# Patient Record
Sex: FEMALE | Race: WHITE | Employment: UNEMPLOYED | URBAN - METROPOLITAN AREA
[De-identification: names, ages, dates, MRNs, and addresses within clinical notes are randomized per-mention and may not be internally consistent; named-entity substitution may affect disease eponyms.]

---

## 2017-02-15 ENCOUNTER — LAB ENCOUNTER (OUTPATIENT)
Dept: LAB | Facility: HOSPITAL | Age: 52
End: 2017-02-15
Attending: INTERNAL MEDICINE
Payer: MEDICAID

## 2017-02-15 DIAGNOSIS — B15.9: ICD-10-CM

## 2017-02-15 DIAGNOSIS — D64.9 ANEMIA, UNSPECIFIED: Primary | ICD-10-CM

## 2017-02-15 DIAGNOSIS — Z11.59 SCREENING EXAMINATION FOR POLIOMYELITIS: ICD-10-CM

## 2017-02-15 DIAGNOSIS — E55.9 AVITAMINOSIS D: ICD-10-CM

## 2017-02-15 LAB
AFP-TM SERPL-MCNC: 9.4 NG/ML (ref 0–8.9)
ALBUMIN SERPL BCP-MCNC: 4.1 G/DL (ref 3.5–4.8)
ALBUMIN SERPL BCP-MCNC: 4.1 G/DL (ref 3.5–4.8)
ALBUMIN/GLOB SERPL: 1.5 {RATIO} (ref 1–2)
ALP SERPL-CCNC: 69 U/L (ref 32–100)
ALP SERPL-CCNC: 69 U/L (ref 32–100)
ALT SERPL-CCNC: 21 U/L (ref 14–54)
ALT SERPL-CCNC: 21 U/L (ref 14–54)
ANION GAP SERPL CALC-SCNC: 10 MMOL/L (ref 0–18)
AST SERPL-CCNC: 23 U/L (ref 15–41)
AST SERPL-CCNC: 23 U/L (ref 15–41)
BASOPHILS # BLD: 0.1 K/UL (ref 0–0.2)
BASOPHILS NFR BLD: 1 %
BILIRUB DIRECT SERPL-MCNC: 0.1 MG/DL (ref 0–0.2)
BILIRUB SERPL-MCNC: 0.7 MG/DL (ref 0.3–1.2)
BILIRUB SERPL-MCNC: 0.7 MG/DL (ref 0.3–1.2)
BUN SERPL-MCNC: 15 MG/DL (ref 8–20)
BUN/CREAT SERPL: 17.6 (ref 10–20)
CALCIUM SERPL-MCNC: 10.1 MG/DL (ref 8.5–10.5)
CHLORIDE SERPL-SCNC: 102 MMOL/L (ref 95–110)
CO2 SERPL-SCNC: 26 MMOL/L (ref 22–32)
CREAT SERPL-MCNC: 0.85 MG/DL (ref 0.5–1.5)
EOSINOPHIL # BLD: 0.7 K/UL (ref 0–0.7)
EOSINOPHIL NFR BLD: 9 %
ERYTHROCYTE [DISTWIDTH] IN BLOOD BY AUTOMATED COUNT: 14 % (ref 11–15)
GLOBULIN PLAS-MCNC: 2.8 G/DL (ref 2.5–3.7)
GLUCOSE SERPL-MCNC: 88 MG/DL (ref 70–99)
HCT VFR BLD AUTO: 38 % (ref 35–48)
HGB BLD-MCNC: 12.4 G/DL (ref 12–16)
LYMPHOCYTES # BLD: 1.7 K/UL (ref 1–4)
LYMPHOCYTES NFR BLD: 23 %
MCH RBC QN AUTO: 28.3 PG (ref 27–32)
MCHC RBC AUTO-ENTMCNC: 32.7 G/DL (ref 32–37)
MCV RBC AUTO: 86.5 FL (ref 80–100)
MONOCYTES # BLD: 0.6 K/UL (ref 0–1)
MONOCYTES NFR BLD: 8 %
NEUTROPHILS # BLD AUTO: 4.3 K/UL (ref 1.8–7.7)
NEUTROPHILS NFR BLD: 58 %
OSMOLALITY UR CALC.SUM OF ELEC: 286 MOSM/KG (ref 275–295)
PLATELET # BLD AUTO: 346 K/UL (ref 140–400)
PMV BLD AUTO: 9.1 FL (ref 7.4–10.3)
POTASSIUM SERPL-SCNC: 4.1 MMOL/L (ref 3.3–5.1)
PROT SERPL-MCNC: 6.9 G/DL (ref 5.9–8.4)
PROT SERPL-MCNC: 6.9 G/DL (ref 5.9–8.4)
RBC # BLD AUTO: 4.39 M/UL (ref 3.7–5.4)
SODIUM SERPL-SCNC: 138 MMOL/L (ref 136–144)
T3FREE SERPL-MCNC: 3.96 PG/ML (ref 2.53–4.29)
T4 FREE SERPL-MCNC: 1.08 NG/DL (ref 0.58–1.64)
TSH SERPL-ACNC: 0.86 UIU/ML (ref 0.34–5.6)
VIT B12 SERPL-MCNC: 694 PG/ML (ref 181–914)
WBC # BLD AUTO: 7.4 K/UL (ref 4–11)

## 2017-02-15 PROCEDURE — 84481 FREE ASSAY (FT-3): CPT

## 2017-02-15 PROCEDURE — 82607 VITAMIN B-12: CPT

## 2017-02-15 PROCEDURE — 36415 COLL VENOUS BLD VENIPUNCTURE: CPT

## 2017-02-15 PROCEDURE — 82105 ALPHA-FETOPROTEIN SERUM: CPT

## 2017-02-15 PROCEDURE — 82248 BILIRUBIN DIRECT: CPT

## 2017-02-15 PROCEDURE — 83036 HEMOGLOBIN GLYCOSYLATED A1C: CPT

## 2017-02-15 PROCEDURE — 80053 COMPREHEN METABOLIC PANEL: CPT

## 2017-02-15 PROCEDURE — 84439 ASSAY OF FREE THYROXINE: CPT

## 2017-02-15 PROCEDURE — 82306 VITAMIN D 25 HYDROXY: CPT

## 2017-02-15 PROCEDURE — 85025 COMPLETE CBC W/AUTO DIFF WBC: CPT

## 2017-02-15 PROCEDURE — 84443 ASSAY THYROID STIM HORMONE: CPT

## 2017-02-16 LAB — HBA1C MFR BLD: 5.2 % (ref 4–6)

## 2017-02-17 LAB — 25(OH)D3 SERPL-MCNC: 28.9 NG/ML

## 2017-02-24 ENCOUNTER — HOSPITAL ENCOUNTER (OUTPATIENT)
Dept: ULTRASOUND IMAGING | Facility: HOSPITAL | Age: 52
Discharge: HOME | End: 2017-02-24
Attending: INTERNAL MEDICINE
Payer: MEDICAID

## 2017-02-24 ENCOUNTER — LAB ENCOUNTER (OUTPATIENT)
Dept: LAB | Facility: HOSPITAL | Age: 52
End: 2017-02-24
Attending: INTERNAL MEDICINE
Payer: MEDICAID

## 2017-02-24 DIAGNOSIS — E11.9 DIABETES MELLITUS (HCC): ICD-10-CM

## 2017-02-24 DIAGNOSIS — E05.90 PRETIBIAL MYXEDEMA: ICD-10-CM

## 2017-02-24 DIAGNOSIS — Z11.59 SCREENING EXAMINATION FOR POLIOMYELITIS: Primary | ICD-10-CM

## 2017-02-24 DIAGNOSIS — E78.2 MIXED HYPERLIPIDEMIA: ICD-10-CM

## 2017-02-24 DIAGNOSIS — K76.89 LIVER NODULE: ICD-10-CM

## 2017-02-24 DIAGNOSIS — D64.9 ANEMIA, UNSPECIFIED: ICD-10-CM

## 2017-02-24 DIAGNOSIS — E10.21 TYPE I DIABETES MELLITUS WITH NEPHROPATHY (HCC): ICD-10-CM

## 2017-02-24 LAB
CHOLEST SERPL-MCNC: 194 MG/DL (ref 110–200)
HDLC SERPL-MCNC: 41 MG/DL
LDLC SERPL CALC-MCNC: 125 MG/DL (ref 0–99)
NONHDLC SERPL-MCNC: 153 MG/DL
TRIGL SERPL-MCNC: 139 MG/DL (ref 1–149)

## 2017-02-24 PROCEDURE — 80061 LIPID PANEL: CPT

## 2017-02-24 PROCEDURE — 76705 ECHO EXAM OF ABDOMEN: CPT

## 2017-02-24 PROCEDURE — 36415 COLL VENOUS BLD VENIPUNCTURE: CPT

## 2018-09-18 NOTE — PROGRESS NOTES
HISTORY OF PRESENT ILLNESS  Patient presents with:  Weight Problem: Patient referred by Dr Marycruz Manzano is a 45year old female new to our office today for initiation of medical weight loss program.  Patient presents today with c/o excess we +fatigue  NECK: denies thickening  LUNGS: denies shortness of breath with exertion, no apnea  CARDIOVASCULAR: denies chest pain on exertion, denies palpitations or pedal edema  GI: denies abdominal pain.   No N/V/D/C  MUSCULOSKELETAL: denies joint pains  NE 02/15/2017    TP 6.9 02/15/2017    TP 6.9 02/15/2017    ALB 4.1 02/15/2017    ALB 4.1 02/15/2017    GLOBULIN 2.8 02/15/2017    AGRATIO 2.0 07/07/2014     02/15/2017    K 4.1 02/15/2017     02/15/2017    CO2 26 02/15/2017     Lab Results   [place] unspecified whether serious comorbidity present  - start Phentermine, Topamax add on in future good option with hx of migraines  -     ELECTROCARDIOGRAM, COMPLETE  -     HEMOGLOBIN A1C; Future  -     LEPTIN, SERUM; Future  -     Discontinue: Phentermine HC carbohydrates which includes sugar items such as sweets as well as rice, pasta, potatoes and bread and make sure to choose whole grain options when having them.   4.  Start a daily probiotic: VSL#3, 1 capsule daily with water- find this over the counter beh

## 2018-09-19 ENCOUNTER — OFFICE VISIT (OUTPATIENT)
Dept: INTERNAL MEDICINE CLINIC | Facility: CLINIC | Age: 53
End: 2018-09-19
Payer: MEDICAID

## 2018-09-19 VITALS
SYSTOLIC BLOOD PRESSURE: 120 MMHG | HEIGHT: 64.5 IN | DIASTOLIC BLOOD PRESSURE: 70 MMHG | RESPIRATION RATE: 16 BRPM | HEART RATE: 78 BPM | BODY MASS INDEX: 34.24 KG/M2 | WEIGHT: 203 LBS

## 2018-09-19 DIAGNOSIS — Z51.81 ENCOUNTER FOR THERAPEUTIC DRUG MONITORING: Primary | ICD-10-CM

## 2018-09-19 DIAGNOSIS — E66.9 CLASS 1 OBESITY WITH BODY MASS INDEX (BMI) OF 34.0 TO 34.9 IN ADULT, UNSPECIFIED OBESITY TYPE, UNSPECIFIED WHETHER SERIOUS COMORBIDITY PRESENT: ICD-10-CM

## 2018-09-19 DIAGNOSIS — G43.009 MIGRAINE WITHOUT AURA AND WITHOUT STATUS MIGRAINOSUS, NOT INTRACTABLE: ICD-10-CM

## 2018-09-19 PROCEDURE — 93000 ELECTROCARDIOGRAM COMPLETE: CPT | Performed by: NURSE PRACTITIONER

## 2018-09-19 PROCEDURE — 99203 OFFICE O/P NEW LOW 30 MIN: CPT | Performed by: NURSE PRACTITIONER

## 2018-09-19 RX ORDER — NYSTATIN 100000 U/G
CREAM TOPICAL AS NEEDED
COMMUNITY

## 2018-09-19 RX ORDER — BUSPIRONE HYDROCHLORIDE 15 MG/1
1 TABLET ORAL 2 TIMES DAILY
Refills: 2 | COMMUNITY
Start: 2018-09-01 | End: 2019-04-03

## 2018-09-19 RX ORDER — SERTRALINE HYDROCHLORIDE 100 MG/1
150 TABLET, FILM COATED ORAL DAILY
COMMUNITY

## 2018-09-19 RX ORDER — PHENTERMINE HYDROCHLORIDE 15 MG/1
15 CAPSULE ORAL EVERY MORNING
Qty: 30 CAPSULE | Refills: 0 | Status: SHIPPED | OUTPATIENT
Start: 2018-09-19 | End: 2018-09-19 | Stop reason: CLARIF

## 2018-09-19 RX ORDER — PHENTERMINE HYDROCHLORIDE 37.5 MG/1
37.5 TABLET ORAL
Qty: 30 TABLET | Refills: 0 | Status: SHIPPED | OUTPATIENT
Start: 2018-09-19 | End: 2018-10-17

## 2018-09-19 RX ORDER — NAPROXEN 500 MG/1
1 TABLET ORAL AS NEEDED
Refills: 2 | COMMUNITY
Start: 2018-07-18

## 2018-09-19 RX ORDER — LAMOTRIGINE 150 MG/1
1 TABLET ORAL DAILY
Refills: 2 | COMMUNITY
Start: 2018-09-02

## 2018-09-19 RX ORDER — LEVOTHYROXINE SODIUM 0.05 MG/1
1 TABLET ORAL DAILY
Refills: 1 | COMMUNITY
Start: 2018-09-18 | End: 2019-04-03

## 2018-09-19 NOTE — PATIENT INSTRUCTIONS
Welcome to the [facility]. ..your Lifestyle Renovation begins now! Thank you for placing your trust in our health care team, I look forward to working with you along this journey to better health!     Next steps:     1.  Schedule a personal n alternative can be done in your home or place of work with little time commitment. Check out [email or web address] blog for continued daily support and education along this weight loss journey!

## 2018-09-25 ENCOUNTER — APPOINTMENT (OUTPATIENT)
Dept: LAB | Age: 53
End: 2018-09-25
Attending: NURSE PRACTITIONER
Payer: MEDICAID

## 2018-09-25 DIAGNOSIS — E66.9 CLASS 1 OBESITY WITH BODY MASS INDEX (BMI) OF 34.0 TO 34.9 IN ADULT, UNSPECIFIED OBESITY TYPE, UNSPECIFIED WHETHER SERIOUS COMORBIDITY PRESENT: ICD-10-CM

## 2018-09-25 LAB
EST. AVERAGE GLUCOSE BLD GHB EST-MCNC: 111 MG/DL (ref 68–126)
HBA1C MFR BLD HPLC: 5.5 % (ref ?–5.7)

## 2018-09-25 PROCEDURE — 82397 CHEMILUMINESCENT ASSAY: CPT

## 2018-09-25 PROCEDURE — 36415 COLL VENOUS BLD VENIPUNCTURE: CPT

## 2018-09-25 PROCEDURE — 83036 HEMOGLOBIN GLYCOSYLATED A1C: CPT

## 2018-10-04 ENCOUNTER — OFFICE VISIT (OUTPATIENT)
Dept: INTERNAL MEDICINE CLINIC | Facility: CLINIC | Age: 53
End: 2018-10-04
Payer: MEDICAID

## 2018-10-04 VITALS — WEIGHT: 203.19 LBS | BODY MASS INDEX: 34 KG/M2

## 2018-10-04 DIAGNOSIS — E66.9 CLASS 1 OBESITY WITH BODY MASS INDEX (BMI) OF 34.0 TO 34.9 IN ADULT, UNSPECIFIED OBESITY TYPE, UNSPECIFIED WHETHER SERIOUS COMORBIDITY PRESENT: Primary | ICD-10-CM

## 2018-10-04 PROCEDURE — 99211 OFF/OP EST MAY X REQ PHY/QHP: CPT | Performed by: DIETITIAN, REGISTERED

## 2018-10-04 NOTE — PROGRESS NOTES
INITIAL OUTPATIENT NUTRITION CONSULTATION    Nutrition Assessment    Medical Diagnosis: Obesity    Physical Findings: n/a    Client Age and Gender: 52year old    Marital Status and Occupation: , works PT in retail    Problem List as of 10/4/20 Non-Fasting, has TG > 400 mg/dl, Chylomicrons are present, Type III Hyperlipidemia, or Type II Diabetes Mellitus. Calculated LDL will not be reported when TG >400 mg/dl.        HDL Cholesterol   Date Value Ref Range Status   02/24/2017 41 mg/dL Final snacks/d    Number of meals/week eaten at restaurants: rare, more recently due to birthday celebrations         Alcohol Intake: not discussed this visit     Diet Quality: Poor    Estimated current caloric intake: 1800 cals/d  (Jess 1898)    Estimated

## 2018-10-17 ENCOUNTER — OFFICE VISIT (OUTPATIENT)
Dept: INTERNAL MEDICINE CLINIC | Facility: CLINIC | Age: 53
End: 2018-10-17
Payer: MEDICAID

## 2018-10-17 VITALS
HEIGHT: 64.5 IN | RESPIRATION RATE: 16 BRPM | DIASTOLIC BLOOD PRESSURE: 74 MMHG | WEIGHT: 197 LBS | HEART RATE: 84 BPM | BODY MASS INDEX: 33.22 KG/M2 | SYSTOLIC BLOOD PRESSURE: 118 MMHG

## 2018-10-17 DIAGNOSIS — Z51.81 ENCOUNTER FOR THERAPEUTIC DRUG MONITORING: Primary | ICD-10-CM

## 2018-10-17 DIAGNOSIS — E66.9 CLASS 1 OBESITY WITH BODY MASS INDEX (BMI) OF 34.0 TO 34.9 IN ADULT, UNSPECIFIED OBESITY TYPE, UNSPECIFIED WHETHER SERIOUS COMORBIDITY PRESENT: ICD-10-CM

## 2018-10-17 DIAGNOSIS — G43.009 MIGRAINE WITHOUT AURA AND WITHOUT STATUS MIGRAINOSUS, NOT INTRACTABLE: ICD-10-CM

## 2018-10-17 PROCEDURE — 99214 OFFICE O/P EST MOD 30 MIN: CPT | Performed by: NURSE PRACTITIONER

## 2018-10-17 RX ORDER — PHENTERMINE HYDROCHLORIDE 37.5 MG/1
37.5 TABLET ORAL
Qty: 30 TABLET | Refills: 0 | Status: SHIPPED | OUTPATIENT
Start: 2018-10-17 | End: 2018-11-14

## 2018-10-17 RX ORDER — IBUPROFEN 800 MG/1
TABLET ORAL
Refills: 0 | COMMUNITY
Start: 2018-10-10 | End: 2018-10-26

## 2018-10-17 RX ORDER — ALBUTEROL SULFATE 90 UG/1
2 AEROSOL, METERED RESPIRATORY (INHALATION)
COMMUNITY
Start: 2018-07-18

## 2018-10-17 RX ORDER — TOPIRAMATE 25 MG/1
25 TABLET ORAL 2 TIMES DAILY
Qty: 60 TABLET | Refills: 1 | Status: SHIPPED | OUTPATIENT
Start: 2018-10-17 | End: 2018-11-18

## 2018-10-17 RX ORDER — DEXAMETHASONE 2 MG/1
TABLET ORAL
Refills: 0 | COMMUNITY
Start: 2018-10-10 | End: 2018-10-26 | Stop reason: ALTCHOICE

## 2018-10-17 NOTE — PATIENT INSTRUCTIONS
Congratulations on your 6# weight loss! Continue making lifestyle changes that focus on good nutrition, regular exercise and stress management.     Today we reviewed your labs with findings of: elevated leptin    Medication Plan: Continue phentermine, add T

## 2018-10-17 NOTE — PROGRESS NOTES
Anjali Montes De Oca is a 52year old female presents today for 1 month follow-up on medical weight loss program for the treatment of overweight, obesity, or morbid obesity with associated elevated leptin.     S:  Current weight Wt Readings from Last 6 Encounters diagnosis)  Class 1 obesity with body mass index (bmi) of 34.0 to 34.9 in adult, unspecified obesity type, unspecified whether serious comorbidity present  Migraine without aura and without status migrainosus, not intractable    No orders of the defined ty heart disease, high blood pressure, diabetes, and some types of cancer. · Managing your weight. · Helping you sleep better. · Preventing or relieving stress, depression, and back problems  · Boosting your energy.   You need to continue exercising to keep

## 2018-10-26 ENCOUNTER — APPOINTMENT (OUTPATIENT)
Dept: CT IMAGING | Facility: HOSPITAL | Age: 53
DRG: 389 | End: 2018-10-26
Attending: EMERGENCY MEDICINE
Payer: MEDICAID

## 2018-10-26 ENCOUNTER — APPOINTMENT (OUTPATIENT)
Dept: GENERAL RADIOLOGY | Facility: HOSPITAL | Age: 53
DRG: 389 | End: 2018-10-26
Attending: EMERGENCY MEDICINE
Payer: MEDICAID

## 2018-10-26 ENCOUNTER — HOSPITAL ENCOUNTER (INPATIENT)
Facility: HOSPITAL | Age: 53
LOS: 3 days | Discharge: HOME | DRG: 389 | End: 2018-10-29
Attending: EMERGENCY MEDICINE | Admitting: HOSPITALIST
Payer: MEDICAID

## 2018-10-26 DIAGNOSIS — K56.609 SMALL BOWEL OBSTRUCTION (HCC): Primary | ICD-10-CM

## 2018-10-26 PROBLEM — R73.9 HYPERGLYCEMIA: Status: ACTIVE | Noted: 2018-10-26

## 2018-10-26 PROBLEM — E87.1 HYPONATREMIA: Status: ACTIVE | Noted: 2018-10-26

## 2018-10-26 PROCEDURE — 99223 1ST HOSP IP/OBS HIGH 75: CPT | Performed by: HOSPITALIST

## 2018-10-26 PROCEDURE — 74177 CT ABD & PELVIS W/CONTRAST: CPT | Performed by: EMERGENCY MEDICINE

## 2018-10-26 PROCEDURE — 71045 X-RAY EXAM CHEST 1 VIEW: CPT | Performed by: EMERGENCY MEDICINE

## 2018-10-26 RX ORDER — ONDANSETRON 2 MG/ML
4 INJECTION INTRAMUSCULAR; INTRAVENOUS EVERY 6 HOURS PRN
Status: DISCONTINUED | OUTPATIENT
Start: 2018-10-26 | End: 2018-10-29

## 2018-10-26 RX ORDER — MORPHINE SULFATE 4 MG/ML
4 INJECTION, SOLUTION INTRAMUSCULAR; INTRAVENOUS EVERY 30 MIN PRN
Status: DISCONTINUED | OUTPATIENT
Start: 2018-10-26 | End: 2018-10-26

## 2018-10-26 RX ORDER — MORPHINE SULFATE 4 MG/ML
1 INJECTION, SOLUTION INTRAMUSCULAR; INTRAVENOUS EVERY 2 HOUR PRN
Status: DISCONTINUED | OUTPATIENT
Start: 2018-10-26 | End: 2018-10-29

## 2018-10-26 RX ORDER — MORPHINE SULFATE 4 MG/ML
4 INJECTION, SOLUTION INTRAMUSCULAR; INTRAVENOUS EVERY 2 HOUR PRN
Status: DISCONTINUED | OUTPATIENT
Start: 2018-10-26 | End: 2018-10-29

## 2018-10-26 RX ORDER — LEVOTHYROXINE SODIUM ANHYDROUS 100 UG/5ML
25 INJECTION, POWDER, LYOPHILIZED, FOR SOLUTION INTRAVENOUS DAILY
Status: DISCONTINUED | OUTPATIENT
Start: 2018-10-27 | End: 2018-10-26 | Stop reason: SDUPTHER

## 2018-10-26 RX ORDER — LIDOCAINE HYDROCHLORIDE 20 MG/ML
10 JELLY TOPICAL ONCE
Status: COMPLETED | OUTPATIENT
Start: 2018-10-26 | End: 2018-10-26

## 2018-10-26 RX ORDER — BISACODYL 10 MG
10 SUPPOSITORY, RECTAL RECTAL
Status: DISCONTINUED | OUTPATIENT
Start: 2018-10-26 | End: 2018-10-29

## 2018-10-26 RX ORDER — METOCLOPRAMIDE HYDROCHLORIDE 5 MG/ML
10 INJECTION INTRAMUSCULAR; INTRAVENOUS EVERY 8 HOURS PRN
Status: DISCONTINUED | OUTPATIENT
Start: 2018-10-26 | End: 2018-10-29

## 2018-10-26 RX ORDER — SODIUM CHLORIDE 9 MG/ML
INJECTION, SOLUTION INTRAVENOUS CONTINUOUS
Status: DISCONTINUED | OUTPATIENT
Start: 2018-10-26 | End: 2018-10-27

## 2018-10-26 RX ORDER — HYDROMORPHONE HYDROCHLORIDE 1 MG/ML
0.5 INJECTION, SOLUTION INTRAMUSCULAR; INTRAVENOUS; SUBCUTANEOUS EVERY 30 MIN PRN
Status: DISCONTINUED | OUTPATIENT
Start: 2018-10-26 | End: 2018-10-26

## 2018-10-26 RX ORDER — POLYETHYLENE GLYCOL 3350 17 G/17G
17 POWDER, FOR SOLUTION ORAL DAILY PRN
Status: DISCONTINUED | OUTPATIENT
Start: 2018-10-26 | End: 2018-10-29

## 2018-10-26 RX ORDER — MORPHINE SULFATE 4 MG/ML
2 INJECTION, SOLUTION INTRAMUSCULAR; INTRAVENOUS EVERY 2 HOUR PRN
Status: DISCONTINUED | OUTPATIENT
Start: 2018-10-26 | End: 2018-10-29

## 2018-10-26 RX ORDER — ALBUTEROL SULFATE 90 UG/1
2 AEROSOL, METERED RESPIRATORY (INHALATION) EVERY 4 HOURS PRN
Status: DISCONTINUED | OUTPATIENT
Start: 2018-10-26 | End: 2018-10-29

## 2018-10-26 RX ORDER — ENOXAPARIN SODIUM 100 MG/ML
40 INJECTION SUBCUTANEOUS EVERY EVENING
Status: DISCONTINUED | OUTPATIENT
Start: 2018-10-26 | End: 2018-10-29

## 2018-10-26 RX ORDER — ONDANSETRON 2 MG/ML
4 INJECTION INTRAMUSCULAR; INTRAVENOUS EVERY 4 HOURS PRN
Status: DISCONTINUED | OUTPATIENT
Start: 2018-10-26 | End: 2018-10-26

## 2018-10-26 RX ORDER — ONDANSETRON 2 MG/ML
4 INJECTION INTRAMUSCULAR; INTRAVENOUS ONCE
Status: COMPLETED | OUTPATIENT
Start: 2018-10-26 | End: 2018-10-26

## 2018-10-26 RX ORDER — SODIUM CHLORIDE 9 MG/ML
INJECTION, SOLUTION INTRAVENOUS CONTINUOUS
Status: DISCONTINUED | OUTPATIENT
Start: 2018-10-26 | End: 2018-10-29

## 2018-10-26 RX ORDER — SODIUM CHLORIDE 9 MG/ML
INJECTION, SOLUTION INTRAVENOUS CONTINUOUS
Status: ACTIVE | OUTPATIENT
Start: 2018-10-26 | End: 2018-10-26

## 2018-10-26 NOTE — H&P
[facility]  History and Physical     Lauren Nightingale Patient Status:  Emergency    10/2/1965 MRN [id number]   Location [address] [facility] Attending Nathaly Spencer MD   Hosp Day # 0 PCP Rubin Cabot     Chief Complaint: abd smoking about 20 years ago. Her smoking use included cigarettes. she has never used smokeless tobacco. She reports that she drinks alcohol. She reports that she does not use drugs.     Family History:   Family History   Problem Relation Age of Onset   • Hyp noted in the HPI. Physical Exam:    /74   Pulse 68   Temp 97.6 °F (36.4 °C) (Temporal)   Resp 15   Ht 5' 4\" (1.626 m)   Wt 197 lb 1.5 oz (89.4 kg)   LMP 09/21/2015   SpO2 100%   BMI 33.83 kg/m²   General: No acute distress.  Alert and oriented x 3

## 2018-10-26 NOTE — ED PROVIDER NOTES
Patient Seen in: [facility] Emergency Department    History   Patient presents with:  Abdomen/Flank Pain (GI/)    Stated Complaint: abd pain    HPI    Patient presents with abdominal pain.   The patient states that yesterday afternoon she developed SURGICAL HISTORY  4/10    liver surgery    • OTHER SURGICAL HISTORY  2000    cervical lamenectomy   • OTHER SURGICAL HISTORY  1999    cervix surgery (leep?)   • TONSILLECTOMY             Social History    Tobacco Use      Smoking status: Former Smoker URINE 6-10 (*)     Bacteria Urine Rare (*)     Squamous Epi.  Cells Moderate (*)     Mucous Urine 2+ (*)     All other components within normal limits   CBC W/ DIFFERENTIAL - Abnormal; Notable for the following components:    RBC 5.23 (*)     Neutrophil Abs She denies diarrhea and complains of nausea. CONTRAST USED:  100cc of Omnipaque 350  FINDINGS:  LIVER:  There is evidence of a partial hepatectomy with surgical removal of the lateral segment of the left lobe of the liver.   No underlying hepatic lesions evidence of free air or free intraperitoneal fluid/ascites. 3. There are noted to be surgical changes of partial hepatectomy , cholecystectomy and hysterectomy. Dictated by: Macarena Us DO on 10/26/2018 at 14:18     Approved by:  Macarena Us DO

## 2018-10-26 NOTE — PROGRESS NOTES
10/26/18 6103   Clinical Encounter Type   Visited With Patient; Health care provider   Routine Visit Introduction   Continue Visiting No   Patient's Supportive Strategies/Resources ( honored patient by acknowledging her supportive resources.)   P

## 2018-10-27 PROCEDURE — 99232 SBSQ HOSP IP/OBS MODERATE 35: CPT | Performed by: HOSPITALIST

## 2018-10-27 RX ORDER — POTASSIUM CHLORIDE 14.9 MG/ML
20 INJECTION INTRAVENOUS ONCE
Status: COMPLETED | OUTPATIENT
Start: 2018-10-27 | End: 2018-10-27

## 2018-10-27 NOTE — PLAN OF CARE
Impaired Activities of Daily Living    • Achieve highest/safest level of independence in self care Completed          GASTROINTESTINAL - ADULT    • Maintains adequate nutritional intake (undernourished) Not Progressing    • Achieves appropriate nutritional

## 2018-10-27 NOTE — CONSULTS
[facility]  Report of Consultation    Ty Rivers Patient Status:  Inpatient    10/2/1965 MRN [id number]   [facility] 3NW-A Attending Dino Mojica MD   Hosp Day # 1 PCP Corbin Musa     Reason for Consultation:  Abdominal pain, [id number]   • Other (Other) Mother         hysterectomy at age 43   • Cancer Paternal Grandfather         colon and liver   • Hypertension Sister    • Thyroid Disorder Sister       reports that she quit smoking about 20 years ago.  Her smoking use included negative  RESPIRATORY: denies shortness of breath, wheezing or cough   CARDIOVASCULAR: denies chest pain or CARRANZA; no palpitations   GI: denies nausea, vomiting, constipation, diarrhea; no rectal bleeding; no heartburn  GENITAL/: no dysuria, urgency or sondra measures. This will include an NG tube to low intermittent wall suction, pain control, and serial examinations. I would recommend repeating an obstructive series in the morning to assess progress.  If the patient clinical condition worsens or they did not i

## 2018-10-27 NOTE — PLAN OF CARE
Pt c/o pain at 5-6 and was given Morphine 4mg. Pt reports that she can feel the gas moving in her abdomen and will notify if passes any. Pt up in chair.

## 2018-10-27 NOTE — CONSULTS
[facility] Pharmacy Note:  Automatic IV Levothyroxine Hold    Levothyroxine 25 mcg IV q24h was ordered by Dr. Romero Lynn  Lab Results   Component Value Date    TSH 4.800 10/26/2018     The patient meets the criteria to automatically hold IV levothyroxine per P&T appr

## 2018-10-27 NOTE — PROGRESS NOTES
[facility]  Progress Note     Franklincirilo Geronimo Patient Status:  Inpatient    10/2/1965 MRN [id number]   [facility] 3NW-A Attending Drew Kay MD   Hosp Day # 1 PCP Didier Pulse     Chief Complaint: pain     S: Patient not feelin adhesions  1. IVF  2. NPO  3. Pain and nausea control   4. Surgery   5. Ambulate   2. Hx migraine  3. Obesity, BMI 33.  1. Hold weight loss medications   4. Hypothryoid  1.  Synthroid - d/c by pharmacy     Plan of care:  Cont supportive care     Quality:  ·

## 2018-10-28 ENCOUNTER — APPOINTMENT (OUTPATIENT)
Dept: GENERAL RADIOLOGY | Facility: HOSPITAL | Age: 53
DRG: 389 | End: 2018-10-28
Attending: SURGERY
Payer: MEDICAID

## 2018-10-28 PROCEDURE — 74019 RADEX ABDOMEN 2 VIEWS: CPT | Performed by: SURGERY

## 2018-10-28 PROCEDURE — 99232 SBSQ HOSP IP/OBS MODERATE 35: CPT | Performed by: HOSPITALIST

## 2018-10-28 RX ORDER — BUSPIRONE HYDROCHLORIDE 5 MG/1
15 TABLET ORAL 2 TIMES DAILY
Status: DISCONTINUED | OUTPATIENT
Start: 2018-10-28 | End: 2018-10-29

## 2018-10-28 RX ORDER — ACETAMINOPHEN 325 MG/1
650 TABLET ORAL EVERY 6 HOURS PRN
Status: DISCONTINUED | OUTPATIENT
Start: 2018-10-28 | End: 2018-10-29

## 2018-10-28 RX ORDER — ALPRAZOLAM 0.5 MG/1
TABLET ORAL 3 TIMES DAILY PRN
Status: DISCONTINUED | OUTPATIENT
Start: 2018-10-28 | End: 2018-10-29

## 2018-10-28 RX ORDER — TOPIRAMATE 25 MG/1
25 TABLET ORAL 2 TIMES DAILY
Status: DISCONTINUED | OUTPATIENT
Start: 2018-10-28 | End: 2018-10-29

## 2018-10-28 RX ORDER — LEVOTHYROXINE SODIUM 0.05 MG/1
50 TABLET ORAL
Status: DISCONTINUED | OUTPATIENT
Start: 2018-10-29 | End: 2018-10-29

## 2018-10-28 RX ORDER — ALPRAZOLAM 0.25 MG/1
0.25 TABLET ORAL AS NEEDED
COMMUNITY

## 2018-10-28 NOTE — PROGRESS NOTES
[facility]  Progress Note     Karel Gomez Patient Status:  Inpatient    10/2/1965 MRN [id number]   [facility] 3NW-A Attending Jamal Borja MD   [facility] Day # 2 PCP Prosper Guzman     Chief Complaint: pain     S: Feeling similar.  H Intravenous Daily       ASSESSMENT / PLAN:     1. SBO due to adhesions  1. Pain and nausea control   2. Surgery   3. Ambulate   2. Hx migraine  3. Obesity, BMI 33.  1. Hold weight loss medications   4. Hypothryoid  1.  Synthroid    Plan of care:  Cont suppo

## 2018-10-28 NOTE — PROGRESS NOTES
[facility]  Progress Note    Len Cleaning Patient Status:  Inpatient    10/2/1965 MRN [id number]   [facility] 3NW-A Attending Efraín Little MD   Hosp Day # 2 PCP Sofía Richey     Subjective:  xrays reviewed and improved    Babak Ramirez

## 2018-10-29 VITALS
OXYGEN SATURATION: 100 % | BODY MASS INDEX: 33.64 KG/M2 | WEIGHT: 197.06 LBS | HEART RATE: 75 BPM | RESPIRATION RATE: 16 BRPM | TEMPERATURE: 99 F | HEIGHT: 64 IN | DIASTOLIC BLOOD PRESSURE: 60 MMHG | SYSTOLIC BLOOD PRESSURE: 113 MMHG

## 2018-10-29 PROCEDURE — 99232 SBSQ HOSP IP/OBS MODERATE 35: CPT | Performed by: HOSPITALIST

## 2018-10-29 NOTE — PAYOR COMM NOTE
--------------  ADMISSION REVIEW     Payor: Rodolfo Burton #:  [id number]  Authorization Number: [id number]    Admit date: 10/26/18  Admit time: 1752       Admitting Physician: Nina Jason MD  Attending Physician 11/17/2012    Procedure: COLONOSCOPY, POSSIBLE BIOPSY, POSSIBLE POLYPECTOMY 85484;  Surgeon: Sayda Stubbs MD;  Location: [address] [place] [address]   • HERNIA SURGERY  2/17/2011 GUILHERME BELLE    Laparoscopic repair of ventral incisional hernia w/lysis of adhe Trace (*)     WBC Urine 5-10 (*)     RBC URINE 6-10 (*)     Bacteria Urine Rare (*)     Squamous Epi.  Cells Moderate (*)     Mucous Urine 2+ (*)     All other components within normal limits   CBC W/ DIFFERENTIAL - Abnormal; Notable for the following compo ICD-10-CM Noted POA    Hyperglycemia R73.9 10/26/2018 Yes    Hyponatremia E87.1 10/26/2018 Yes    Small bowel obstruction [facility]) K56.609 10/26/2018 Unknown              [facility]  History and Physical     Shonda Stahl Patient Status:  Emergency deformity. Abdomen: Soft,tender - worse rlq, nondistended. No BS . No rebound, guarding or organomegaly. Neurologic: No focal neurological deficits. CNII-XII grossly intact. Musculoskeletal: Moves all extremities. Extremities: No edema or cyanosis.   I obstruction     I have recommended an initial attempt at conservative measures. This will include an NG tube to low intermittent wall suction, pain control, and serial examinations.  I would recommend repeating an obstructive series in the morning to assess ambulation  Ok for dc home today if tolerating soft diet            MEDICATIONS ADMINISTERED IN LAST 1 DAY:  Enoxaparin Sodium (LOVENOX) 40 MG/0.4ML injection 40 mg     Date Action Dose Route User    10/28/2018 2228 Given 40 mg Subcutaneous (Left Lower Abd

## 2018-10-29 NOTE — PROGRESS NOTES
[facility]  Progress Note     Dominic Ling Patient Status:  Inpatient    10/2/1965 MRN [id number]   [facility] 3NW-A Attending Jese Alcala MD   [address] Day # 3 PCP Bryon Navarro     Chief Complaint: pain     S: Had a lot of anxie reviewed in Epic.     Medications:   • BusPIRone HCl  15 mg Oral BID   • lamoTRIgine  150 mg Oral Daily   • Levothyroxine Sodium  50 mcg Oral Before breakfast   • Sertraline HCl  150 mg Oral Daily   • topiramate  25 mg Oral BID   • pantoprazole (PROTONIX) I

## 2018-10-29 NOTE — PROGRESS NOTES
[facility]  Progress Note    Moe Pearce Patient Status:  Inpatient    10/2/1965 MRN [id number]   [facility] 3NW-A Attending Lilia Leventhal, MD   Hosp Day # 3 PCP Jenniffer Rosa     Subjective:    Patient reports pain improved, ruddy
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
## 2018-10-30 NOTE — PAYOR COMM NOTE
--------------  DISCHARGE REVIEW    Payor: Rodolfo Burton #:  [id number]  Authorization Number: [id number]    Admit date: 10/26/18  Admit time:  6169  Discharge Date: 10/29/2018  6:04 PM     Admitting Physician: Yolanda Day

## 2018-10-31 NOTE — DISCHARGE SUMMARY
[facility]  DISCHARGE SUMMARY     Anjali Montes De Oca Patient Status:  Inpatient    10/2/1965 MRN [id number]   [facility] 3NW-A Attending No att. providers found   Hosp Day # 3 PCP Low Cummings     Date of Admission: 10/26/2018  Date of cholecystectomy and hysterectomy.     Lab/Test results pending at Discharge:   · None     Consultants:  • Surgery     Discharge Medication List:     Discharge Medications      CONTINUE taking these medications      Instructions Prescription details   [place] spent:  > 30 minutes

## 2018-11-06 ENCOUNTER — OFFICE VISIT (OUTPATIENT)
Dept: INTERNAL MEDICINE CLINIC | Facility: CLINIC | Age: 53
End: 2018-11-06
Payer: MEDICAID

## 2018-11-06 VITALS — HEIGHT: 64.5 IN | WEIGHT: 189.63 LBS | BODY MASS INDEX: 31.98 KG/M2

## 2018-11-06 DIAGNOSIS — E66.09 CLASS 1 OBESITY DUE TO EXCESS CALORIES WITH BODY MASS INDEX (BMI) OF 32.0 TO 32.9 IN ADULT, UNSPECIFIED WHETHER SERIOUS COMORBIDITY PRESENT: Primary | ICD-10-CM

## 2018-11-06 PROCEDURE — 99211 OFF/OP EST MAY X REQ PHY/QHP: CPT | Performed by: DIETITIAN, REGISTERED

## 2018-11-06 NOTE — PROGRESS NOTES
FOLLOW UP NUTRITION CONSULTATION    Nutrition Assessment    Number of consultations with dietitian: 2    Height:  Ht Readings from Last 1 Encounters:  11/06/18 : 64.5\"      Weight:   Wt Readings from Last 2 Encounters:  11/06/18 : 189 lb 9.6 oz  10/26/

## 2018-11-14 ENCOUNTER — OFFICE VISIT (OUTPATIENT)
Dept: INTERNAL MEDICINE CLINIC | Facility: CLINIC | Age: 53
End: 2018-11-14
Payer: MEDICAID

## 2018-11-14 VITALS
BODY MASS INDEX: 31.71 KG/M2 | DIASTOLIC BLOOD PRESSURE: 60 MMHG | WEIGHT: 188 LBS | SYSTOLIC BLOOD PRESSURE: 110 MMHG | RESPIRATION RATE: 16 BRPM | HEART RATE: 80 BPM | HEIGHT: 64.5 IN

## 2018-11-14 DIAGNOSIS — E66.9 CLASS 1 OBESITY WITH BODY MASS INDEX (BMI) OF 34.0 TO 34.9 IN ADULT, UNSPECIFIED OBESITY TYPE, UNSPECIFIED WHETHER SERIOUS COMORBIDITY PRESENT: ICD-10-CM

## 2018-11-14 DIAGNOSIS — Z51.81 ENCOUNTER FOR THERAPEUTIC DRUG MONITORING: Primary | ICD-10-CM

## 2018-11-14 PROCEDURE — 99213 OFFICE O/P EST LOW 20 MIN: CPT | Performed by: NURSE PRACTITIONER

## 2018-11-14 RX ORDER — PHENTERMINE HYDROCHLORIDE 37.5 MG/1
37.5 TABLET ORAL
Qty: 30 TABLET | Refills: 0 | Status: SHIPPED | OUTPATIENT
Start: 2018-11-14 | End: 2018-12-11

## 2018-11-14 NOTE — PROGRESS NOTES
Len Cleaning is a 52year old female presents today for 2 month follow-up on medical weight loss program for the treatment of overweight, obesity, or morbid obesity with associated elevated leptin.     S:  Current weight Wt Readings from Last 6 Encounters normal S1 and S2 without clicks or gallops, no pedal edema.   GI: +BS, soft, non tender  NEURO/MS: motor and sensory grossly intact  PSYCH: pleasant, cooperative, normal mood and affect    ASSESSMENT AND PLAN:  Encounter for therapeutic drug monitoring  (pr deadline at work, a financial problem, or a sick family member   being overweight   depression, anxiety, or other mental health problems   medical problems such as sleep apnea or hyperthyroidism   restless leg syndrome (muscles in your lower legs twitch or will ask you about:   your sleep patterns   use of caffeine, alcohol, medicine, and other drugs   eating and exercise habits   your mental and physical condition   your medical and mental health history, and your family's history   your job and travel aldo Your healthcare provider may recommend relaxation techniques, changes in diet, cutting out caffeine, and a healthy lifestyle that includes exercise. Your provider also will probably discuss good sleep habits and a regular sleep routine.    How long will t example, don't keep checking the clock and worry about why you are not asleep yet. If you are awake for more than 20 minutes, leave the bed and do not go back to bed until you feel ready to sleep.    Try to reduce stress in your life by changing the things

## 2018-11-14 NOTE — PATIENT INSTRUCTIONS
Continue making lifestyle changes that focus on good nutrition, regular exercise and stress management. Medication Plan: Reduce phentermine to 1/2 tab daily in the AM and resume Topamax at 1 tab twice a day.     Agreed upon goal/s before next office visi caused by irregular sleep-wake patterns resulting from shift work, drug dependency (including long-term use of sleeping pills or alcohol), stress, illness, or mental health problems such as anxiety or depression.  It lasts longer than 3 weeks and requires t should help the insomnia. If drug or alcohol abuse is the cause of your insomnia, the treatment is to help you to stop using these substances. If you have chronic insomnia, it must be treated with management of the underlying problem.    In some cases of te other stimulants, cigarettes, and alcohol. Do not drink alcohol within 6 hours of bedtime. If you smoke, try to quit smoking entirely. Cutting back on smoking without quitting may lead to nicotine withdrawal in the middle of the night that awakens you.    E

## 2018-11-18 DIAGNOSIS — Z51.81 ENCOUNTER FOR THERAPEUTIC DRUG MONITORING: ICD-10-CM

## 2018-11-18 DIAGNOSIS — E66.9 CLASS 1 OBESITY WITH BODY MASS INDEX (BMI) OF 34.0 TO 34.9 IN ADULT, UNSPECIFIED OBESITY TYPE, UNSPECIFIED WHETHER SERIOUS COMORBIDITY PRESENT: ICD-10-CM

## 2018-11-18 DIAGNOSIS — G43.009 MIGRAINE WITHOUT AURA AND WITHOUT STATUS MIGRAINOSUS, NOT INTRACTABLE: ICD-10-CM

## 2018-11-19 NOTE — TELEPHONE ENCOUNTER
Requesting topiramate (TOPAMAX) 25 MG Oral Tab     LOV: 10/17  RTC: 4 weeks  Last Relevant Labs:   Filled: 10/17/18#60with 1 refills    Future Appointments   Date Time Provider Alaina Cruz   12/11/2018  2:40 PM JOSH Mckeon EMGRASHARDI [facility] 75

## 2018-11-21 RX ORDER — TOPIRAMATE 25 MG/1
25 TABLET ORAL 2 TIMES DAILY
Qty: 60 TABLET | Refills: 1 | Status: SHIPPED | OUTPATIENT
Start: 2018-11-21 | End: 2019-02-11

## 2018-12-11 ENCOUNTER — OFFICE VISIT (OUTPATIENT)
Dept: INTERNAL MEDICINE CLINIC | Facility: CLINIC | Age: 53
End: 2018-12-11
Payer: MEDICAID

## 2018-12-11 VITALS
HEART RATE: 80 BPM | WEIGHT: 181 LBS | HEIGHT: 64.5 IN | DIASTOLIC BLOOD PRESSURE: 72 MMHG | RESPIRATION RATE: 16 BRPM | BODY MASS INDEX: 30.52 KG/M2 | SYSTOLIC BLOOD PRESSURE: 112 MMHG

## 2018-12-11 DIAGNOSIS — E66.9 CLASS 1 OBESITY WITH BODY MASS INDEX (BMI) OF 34.0 TO 34.9 IN ADULT, UNSPECIFIED OBESITY TYPE, UNSPECIFIED WHETHER SERIOUS COMORBIDITY PRESENT: ICD-10-CM

## 2018-12-11 DIAGNOSIS — Z51.81 ENCOUNTER FOR THERAPEUTIC DRUG MONITORING: Primary | ICD-10-CM

## 2018-12-11 DIAGNOSIS — G43.009 MIGRAINE WITHOUT AURA AND WITHOUT STATUS MIGRAINOSUS, NOT INTRACTABLE: ICD-10-CM

## 2018-12-11 PROCEDURE — 99213 OFFICE O/P EST LOW 20 MIN: CPT | Performed by: NURSE PRACTITIONER

## 2018-12-11 RX ORDER — PHENTERMINE HYDROCHLORIDE 37.5 MG/1
37.5 TABLET ORAL
Qty: 30 TABLET | Refills: 1 | Status: SHIPPED | OUTPATIENT
Start: 2018-12-11 | End: 2019-02-11

## 2018-12-11 NOTE — PATIENT INSTRUCTIONS
Continue making lifestyle changes that focus on good nutrition, regular exercise and stress management. Medication Plan: Continue current medication regimen.     Agreed upon goal/s before next office visit include: Establish a fitness routine to help wit · Headspace  · Podcast: The [place] [employer] with Thor Ek    Books/Video Education  · Mindless Eating by Gamaliel Antoine  · The Complete Guide to fasting by Dr. Jennifer Spicer  · Salt, Sugar, Fat by Antoine Boss  · Fed Up - documentary on sugar 1. Burn more calories. Unlike fat, muscle beefs up your metabolism to help you burn about 70% more calories than fat can. 2. Improve appearance. When done properly, strength training can greatly improve your posture and help to prevent joint pain.   [facility]

## 2018-12-11 NOTE — PROGRESS NOTES
Kiran Vogel is a 52year old female presents today for 3 month follow-up on medical weight loss program for the treatment of overweight, obesity, or morbid obesity with associated elevated leptin.     S:  Current weight Wt Readings from Last 6 Encounters NEURO/MS: motor and sensory grossly intact  PSYCH: pleasant, cooperative, normal mood and affect    ASSESSMENT AND PLAN:  Encounter for therapeutic drug monitoring  (primary encounter diagnosis)  Class 1 obesity with body mass index (bmi) of 34.0 to 34.9 i · Make A Difference (MAD) onsite structured, small group fitness held in our physical therapy department and led by a /exercise physiologist. Visit our PT department downstairs or call [phone] to get started.   · [employer] @ http: Well, look no further! With the fat vs muscle diagram below you’ll see why healthy permanent weight loss requires you to build muscle to lose fat. Fat vs Muscle Diagram  The facts are clear. The best way to lose fat and look slimmer is to build muscle.  Si 5. Increase bone density. Weight bearing activities improve your bone density and reduce bone loss. This helps to prevent osteoporosis.   Studies show that weight training combined with aerobics and stretching is the best way to build a strong, firm body an

## 2019-01-03 ENCOUNTER — OFFICE VISIT (OUTPATIENT)
Dept: INTERNAL MEDICINE CLINIC | Facility: CLINIC | Age: 54
End: 2019-01-03
Payer: MEDICAID

## 2019-01-03 VITALS — WEIGHT: 178 LBS | BODY MASS INDEX: 30.02 KG/M2 | HEIGHT: 64.5 IN

## 2019-01-03 DIAGNOSIS — E66.09 CLASS 1 OBESITY DUE TO EXCESS CALORIES WITH BODY MASS INDEX (BMI) OF 30.0 TO 30.9 IN ADULT, UNSPECIFIED WHETHER SERIOUS COMORBIDITY PRESENT: Primary | ICD-10-CM

## 2019-01-03 PROCEDURE — 99211 OFF/OP EST MAY X REQ PHY/QHP: CPT | Performed by: DIETITIAN, REGISTERED

## 2019-01-03 NOTE — PROGRESS NOTES
FOLLOW UP NUTRITION CONSULTATION    Nutrition Assessment    Number of consultations with dietitian: 3    Height:  Ht Readings from Last 1 Encounters:  01/03/19 : 64.5\"      Weight:   Wt Readings from Last 2 Encounters:  01/03/19 : 178 lb  12/11/18 : 18 logs  · Focus on simple meal planning  · Prioritize protein  · Increase structured exercise - aim for 10 minute bouts throughout the day     Monitoring/Evaluation: Patient encouraged to schedule follow up appt        Vicenta Bumpers MS, RD, LDN, CSOWM

## 2019-02-11 ENCOUNTER — OFFICE VISIT (OUTPATIENT)
Dept: INTERNAL MEDICINE CLINIC | Facility: CLINIC | Age: 54
End: 2019-02-11
Payer: MEDICAID

## 2019-02-11 VITALS
DIASTOLIC BLOOD PRESSURE: 60 MMHG | SYSTOLIC BLOOD PRESSURE: 120 MMHG | HEART RATE: 70 BPM | RESPIRATION RATE: 14 BRPM | BODY MASS INDEX: 29.51 KG/M2 | HEIGHT: 64.5 IN | WEIGHT: 175 LBS

## 2019-02-11 DIAGNOSIS — E66.9 CLASS 1 OBESITY WITH BODY MASS INDEX (BMI) OF 34.0 TO 34.9 IN ADULT, UNSPECIFIED OBESITY TYPE, UNSPECIFIED WHETHER SERIOUS COMORBIDITY PRESENT: ICD-10-CM

## 2019-02-11 DIAGNOSIS — Z51.81 ENCOUNTER FOR THERAPEUTIC DRUG MONITORING: Primary | ICD-10-CM

## 2019-02-11 DIAGNOSIS — G43.009 MIGRAINE WITHOUT AURA AND WITHOUT STATUS MIGRAINOSUS, NOT INTRACTABLE: ICD-10-CM

## 2019-02-11 PROCEDURE — 99213 OFFICE O/P EST LOW 20 MIN: CPT | Performed by: NURSE PRACTITIONER

## 2019-02-11 RX ORDER — TOPIRAMATE 50 MG/1
50 TABLET, FILM COATED ORAL 2 TIMES DAILY
Qty: 60 TABLET | Refills: 1 | Status: SHIPPED | OUTPATIENT
Start: 2019-02-11 | End: 2019-04-03

## 2019-02-11 RX ORDER — PHENTERMINE HYDROCHLORIDE 37.5 MG/1
37.5 TABLET ORAL EVERY MORNING
Qty: 30 TABLET | Refills: 1 | Status: SHIPPED | OUTPATIENT
Start: 2019-02-11 | End: 2019-04-03

## 2019-02-11 NOTE — PROGRESS NOTES
Mildred Joel is a 52year old female presents today for 4 month follow-up on medical weight loss program for the treatment of overweight, obesity, or morbid obesity with associated elevated leptin.     S:  Current weight Wt Readings from Last 6 Encounters cooperative, normal mood and affect    ASSESSMENT AND PLAN:  Encounter for therapeutic drug monitoring  (primary encounter diagnosis)  Class 1 obesity with body mass index (bmi) of 34.0 to 34.9 in adult, unspecified obesity type, unspecified whether seriou Options • By Your Weight Matters Campaign    Within our culture, we often have a way of thinking about our body size and weight that is culturally driven.  While culturally-defined standards for weight and body size can definitely influence an individual’s care deeply about their visual appearance, we must also bring health into the bigger picture. Having excess weight can contribute to many different health conditions.  Fortunately, there is a strong relationship between weight-loss and risk factor improveme [employer] in [place], [place]. Cut and paste or click the link below:  https://youtu. be/n62HP4YTBym            Medication use and SEs reviewed with patient. Return in about 6 weeks (around 3/25/2019) for weight mangement.     Patient Kushal Vickers

## 2019-02-11 NOTE — PATIENT INSTRUCTIONS
Continue making lifestyle changes that focus on good nutrition, regular exercise and stress management. Medication Plan: Continue current medication regimen.     Agreed upon goal/s before next office visit include: Establish a fitness routine to help wit and other important brain functions   Medications – Many medications are associated with weight gain such as insulin, oral medications for diabetes, antidepressants and more   Metabolic adaptation – Changes in our metabolism and physiology can affect our e tea in the evenings. Monitor food intake – Be aware of what’s going into your body and how much. Are you controlling your portions? Eating foods that are less-processed and rich with nutrients?    Establish healthy habits – Simple lifestyle changes can do

## 2019-04-03 ENCOUNTER — OFFICE VISIT (OUTPATIENT)
Dept: INTERNAL MEDICINE CLINIC | Facility: CLINIC | Age: 54
End: 2019-04-03
Payer: MEDICAID

## 2019-04-03 VITALS
DIASTOLIC BLOOD PRESSURE: 70 MMHG | WEIGHT: 170 LBS | SYSTOLIC BLOOD PRESSURE: 110 MMHG | HEART RATE: 70 BPM | BODY MASS INDEX: 28.67 KG/M2 | HEIGHT: 64.5 IN | RESPIRATION RATE: 14 BRPM

## 2019-04-03 DIAGNOSIS — E66.9 CLASS 1 OBESITY WITH BODY MASS INDEX (BMI) OF 34.0 TO 34.9 IN ADULT, UNSPECIFIED OBESITY TYPE, UNSPECIFIED WHETHER SERIOUS COMORBIDITY PRESENT: ICD-10-CM

## 2019-04-03 DIAGNOSIS — Z51.81 ENCOUNTER FOR THERAPEUTIC DRUG MONITORING: Primary | ICD-10-CM

## 2019-04-03 DIAGNOSIS — G43.009 MIGRAINE WITHOUT AURA AND WITHOUT STATUS MIGRAINOSUS, NOT INTRACTABLE: ICD-10-CM

## 2019-04-03 PROCEDURE — 99213 OFFICE O/P EST LOW 20 MIN: CPT | Performed by: NURSE PRACTITIONER

## 2019-04-03 RX ORDER — LEVOTHYROXINE SODIUM 0.07 MG/1
75 TABLET ORAL DAILY
COMMUNITY
Start: 2019-02-28

## 2019-04-03 RX ORDER — PHENTERMINE HYDROCHLORIDE 37.5 MG/1
37.5 TABLET ORAL EVERY MORNING
Qty: 30 TABLET | Refills: 1 | Status: SHIPPED | OUTPATIENT
Start: 2019-04-03

## 2019-04-03 RX ORDER — ALPRAZOLAM 0.25 MG/1
0.25 TABLET ORAL
COMMUNITY
Start: 2019-01-18 | End: 2019-04-03

## 2019-04-03 RX ORDER — BUSPIRONE HYDROCHLORIDE 30 MG/1
30 TABLET ORAL 2 TIMES DAILY
Refills: 2 | COMMUNITY
Start: 2019-03-29

## 2019-04-03 RX ORDER — TOPIRAMATE 100 MG/1
100 TABLET, FILM COATED ORAL 2 TIMES DAILY
Qty: 60 TABLET | Refills: 5 | Status: SHIPPED | OUTPATIENT
Start: 2019-04-03

## 2019-04-03 NOTE — PATIENT INSTRUCTIONS
Continue making lifestyle changes that focus on good nutrition, regular exercise and stress management. Medication Plan: Continue current medication regimen, aside from increase Topamax.     Agreed upon goal/s before next office visit include: Start walk

## 2019-04-03 NOTE — PROGRESS NOTES
Kiran Vogel is a 52year old female presents today for 6 month follow-up on medical weight loss program for the treatment of overweight, obesity, or morbid obesity with associated elevated leptin.     S:  Current weight Wt Readings from Last 6 Encounters affect    ASSESSMENT AND PLAN:  Encounter for therapeutic drug monitoring  (primary encounter diagnosis)  Class 1 obesity with body mass index (bmi) of 34.0 to 34.9 in adult, unspecified obesity type, unspecified whether serious comorbidity present  [place] • 80% of persons in the registry are women and 20% are men. • The \"average\" woman is 39years of age and currently weighs 145 lbs, while the \"average\" man is 52years of age and currently weighs 190 lbs.   • Registry members have lost an average of 6